# Patient Record
Sex: FEMALE
[De-identification: names, ages, dates, MRNs, and addresses within clinical notes are randomized per-mention and may not be internally consistent; named-entity substitution may affect disease eponyms.]

---

## 2024-04-08 ENCOUNTER — NON-APPOINTMENT (OUTPATIENT)
Age: 74
End: 2024-04-08

## 2024-04-08 DIAGNOSIS — Z80.3 FAMILY HISTORY OF MALIGNANT NEOPLASM OF BREAST: ICD-10-CM

## 2024-04-08 DIAGNOSIS — M65.30 TRIGGER FINGER, UNSPECIFIED FINGER: ICD-10-CM

## 2024-04-08 DIAGNOSIS — Z82.61 FAMILY HISTORY OF ARTHRITIS: ICD-10-CM

## 2024-04-08 DIAGNOSIS — Z92.3 PERSONAL HISTORY OF IRRADIATION: ICD-10-CM

## 2024-04-08 DIAGNOSIS — U07.1 COVID-19: ICD-10-CM

## 2024-04-08 DIAGNOSIS — Z86.69 PERSONAL HISTORY OF OTHER DISEASES OF THE NERVOUS SYSTEM AND SENSE ORGANS: ICD-10-CM

## 2024-04-08 DIAGNOSIS — Z83.79 FAMILY HISTORY OF OTHER DISEASES OF THE DIGESTIVE SYSTEM: ICD-10-CM

## 2024-04-08 DIAGNOSIS — Z85.6 PERSONAL HISTORY OF LEUKEMIA: ICD-10-CM

## 2024-04-08 DIAGNOSIS — Z30.41 ENCOUNTER FOR SURVEILLANCE OF CONTRACEPTIVE PILLS: ICD-10-CM

## 2024-04-08 DIAGNOSIS — Z86.007 PERSONAL HISTORY OF IN-SITU NEOPLASM OF SKIN: ICD-10-CM

## 2024-04-08 DIAGNOSIS — Z78.9 OTHER SPECIFIED HEALTH STATUS: ICD-10-CM

## 2024-04-08 DIAGNOSIS — Z85.828 PERSONAL HISTORY OF OTHER MALIGNANT NEOPLASM OF SKIN: ICD-10-CM

## 2024-04-08 DIAGNOSIS — Z85.3 PERSONAL HISTORY OF MALIGNANT NEOPLASM OF BREAST: ICD-10-CM

## 2024-04-08 DIAGNOSIS — Z87.19 PERSONAL HISTORY OF OTHER DISEASES OF THE DIGESTIVE SYSTEM: ICD-10-CM

## 2024-04-08 DIAGNOSIS — Z80.41 FAMILY HISTORY OF MALIGNANT NEOPLASM OF OVARY: ICD-10-CM

## 2024-04-08 DIAGNOSIS — B00.9 HERPESVIRAL INFECTION, UNSPECIFIED: ICD-10-CM

## 2024-04-08 DIAGNOSIS — Z80.42 FAMILY HISTORY OF MALIGNANT NEOPLASM OF PROSTATE: ICD-10-CM

## 2024-04-08 DIAGNOSIS — Z63.4 DISAPPEARANCE AND DEATH OF FAMILY MEMBER: ICD-10-CM

## 2024-04-08 PROBLEM — Z00.00 ENCOUNTER FOR PREVENTIVE HEALTH EXAMINATION: Status: ACTIVE | Noted: 2024-04-08

## 2024-04-08 RX ORDER — OMEPRAZOLE 40 MG/1
40 CAPSULE, DELAYED RELEASE ORAL
Refills: 0 | Status: ACTIVE | COMMUNITY

## 2024-04-08 RX ORDER — ADHESIVE TAPE 3"X 2.3 YD
50 MCG TAPE, NON-MEDICATED TOPICAL
Refills: 0 | Status: ACTIVE | COMMUNITY

## 2024-04-08 RX ORDER — ANASTROZOLE 1 MG/1
1 TABLET ORAL
Refills: 0 | Status: ACTIVE | COMMUNITY

## 2024-04-08 RX ORDER — ALENDRONATE SODIUM 70 MG/1
70 TABLET ORAL
Refills: 0 | Status: ACTIVE | COMMUNITY

## 2024-04-08 SDOH — SOCIAL STABILITY - SOCIAL INSECURITY: DISSAPEARANCE AND DEATH OF FAMILY MEMBER: Z63.4

## 2024-07-29 NOTE — HISTORY OF PRESENT ILLNESS
[de-identified] :   The patient has undergone annual mammogram and sonogram and twice yearly breast ultrasound.  Bilateral mammography and sonography were reportedly unremarkable in 2015.  In 2015, the patient palpated density right lower inner breast.  She presented to Dr. Fenton and was noted to have a density on sonogram in the RT lower outer quadrant, not in the palpable area.  Ultrasound-guided core biopsy on 16, revealed invasive lobular carcinoma,a classic and histiocytoid type, focal DCIS, intermediate nuclear grade, solid type, admixed with LCIS, ER positive, TN negative, and HER-2/sonia negative.  Bilateral breast MRI on 3/23/16, revealed a density consistent with a known cancer as well as an abnormality in the superolateral right breast, for which there was no sonographic correlate, MRI-guided clip placement was done.  On 3/30/16, wide excision and excision of area with clip and sentinel lymph node biopsy revealed 3 sentinel lymph nodes negative, wide excision revealed invasive lobular carcinoma, histiocytoid, pleomorphic, alveolar and focally tubular type, 1.7 cm, present at biopsy site changes, negative margins, non-extensive DCIS, intermediate to high-grade, solid type with necrosis and calcifications present close to within 1 mm of inked anterior and posterior margins, no LVI, additional excision revealed fibrocystic changes and florid duct hyperplasia, negative for carcinoma.  Breast excision revealed 2 areas of intraductal papilloma containing florid duct hyperplasia and a single benign intramammary lymph node, hyalinized fibroadenoma. Tumor ER positive, TN negative, and HER-2/sonia negative.  ORS 20 S/P RT.    ALLERGIES: Questionable allergy to sulfa.   PAST MEDICAL HISTORY: 12 menarche; LMP mid 38, G2, P2,  25 and 30  for  4 months.  GYN 2015, OCP in 20s and 30s, colitis age 17, last colonoscopy 8 years ago, history of osteopenia, had taken 2 doses of Fosamax 8 years ago.  No significant medical illnesses.  SOCIAL HISTORY: , had been an  and .  No tobacco.  Drinks 2 glasses of wine 3 times per week and is active in tennis 2-3 times per week and  twice a week.  Lives alone.  FAMILY HISTORY:My Risk neg.Ashkenazic descent, with family hx: mother  of ovarian cancer at 40, pt's half sister (mother) breast cancer 55, and a maternal great aunt  of breast cancer at 55.  Paternal family history is noncontributory.

## 2024-07-29 NOTE — ASSESSMENT
[FreeTextEntry1] : . MyRisk profile negative for inherited mutation.  T1c 1.7 cm infiltrating lobular carcinoma, histiocytoid, pleomorphic, alveolar and focally tubular type, 3lymph nodes negative, ER positive, MT negative, and HER-2 negative. Oncotype recurrent score 20

## 2024-08-08 ENCOUNTER — NON-APPOINTMENT (OUTPATIENT)
Age: 74
End: 2024-08-08

## 2024-09-05 ENCOUNTER — APPOINTMENT (OUTPATIENT)
Dept: HEMATOLOGY ONCOLOGY | Facility: CLINIC | Age: 74
End: 2024-09-05
Payer: MEDICARE

## 2024-09-05 VITALS
HEIGHT: 63 IN | TEMPERATURE: 98.1 F | RESPIRATION RATE: 18 BRPM | BODY MASS INDEX: 22.68 KG/M2 | DIASTOLIC BLOOD PRESSURE: 67 MMHG | SYSTOLIC BLOOD PRESSURE: 120 MMHG | OXYGEN SATURATION: 97 % | WEIGHT: 128 LBS | HEART RATE: 82 BPM

## 2024-09-05 DIAGNOSIS — M85.80 OTHER SPECIFIED DISORDERS OF BONE DENSITY AND STRUCTURE, UNSPECIFIED SITE: ICD-10-CM

## 2024-09-05 DIAGNOSIS — Z85.3 PERSONAL HISTORY OF MALIGNANT NEOPLASM OF BREAST: ICD-10-CM

## 2024-09-05 DIAGNOSIS — C91.10 CHRONIC LYMPHOCYTIC LEUKEMIA OF B-CELL TYPE NOT HAVING ACHIEVED REMISSION: ICD-10-CM

## 2024-09-05 PROCEDURE — 99214 OFFICE O/P EST MOD 30 MIN: CPT

## 2024-09-05 PROCEDURE — G2211 COMPLEX E/M VISIT ADD ON: CPT

## 2024-09-05 PROCEDURE — 99204 OFFICE O/P NEW MOD 45 MIN: CPT

## 2024-09-05 NOTE — ASSESSMENT
[With Patient/Caregiver] : With Patient/Caregiver [Designated Health Care Proxy] : Designated Health Care Proxy [Name: ___] : Name: [unfilled] [Relationship: ___] : Relationship: [unfilled] [FreeTextEntry1] : MyRisk profile negative for inherited mutation.  T1c 1.7 cm infiltrating lobular carcinoma, histiocytoid, pleomorphic, alveolar and focally tubular type, 3lymph nodes negative, ER positive, [place] negative, and HER-2 negative. Oncotype score 20 s/p anastrozole for 8ys completed 6/24.Discussed breast MRI - due for - MMG/US now. c/o bloating -pelvic us. Cont exercise. Considering move to [facility]. Has health proxy and advance directive. F/u annual with Dr Marquez CLL. F/u 6 months [AdvancecareDate] : 9/5/24

## 2024-09-05 NOTE — HISTORY OF PRESENT ILLNESS
[de-identified] :   The patient has undergone annual mammogram and sonogram and twice yearly breast ultrasound.  Bilateral mammography and sonography were reportedly unremarkable in 2015.  In 2015, the patient palpated density right lower inner breast.  She presented to Dr. Fenton and was noted to have a density on sonogram in the RT lower outer quadrant, not in the palpable area.  Ultrasound-guided core biopsy on 16, revealed invasive lobular carcinoma,a classic and histiocytoid type, focal DCIS, intermediate nuclear grade, solid type, admixed with LCIS, ER positive, LA negative, and HER-2/sonia negative.  Bilateral breast MRI on 3/23/16, revealed a density consistent with a known cancer as well as an abnormality in the superolateral right breast, for which there was no sonographic correlate, MRI-guided clip placement was done.  On 3/30/16, wide excision and excision of area with clip and sentinel lymph node biopsy revealed 3 sentinel lymph nodes negative, wide excision revealed invasive lobular carcinoma, histiocytoid, pleomorphic, alveolar and focally tubular type, 1.7 cm, present at biopsy site changes, negative margins, non-extensive DCIS, intermediate to high-grade, solid type with necrosis and calcifications present close to within 1 mm of inked anterior and posterior margins, no LVI, additional excision revealed fibrocystic changes and florid duct hyperplasia, negative for carcinoma.  Breast excision revealed 2 areas of intraductal papilloma containing florid duct hyperplasia and a single benign intramammary lymph node, hyalinized fibroadenoma. Tumor ER positive, LA negative, and HER-2/sonia negative.  ORS 20 S/P RT. Anastrazole  - . Alendronate dc'd . Jt c/o improved. Annula bilat MMG/US - last . Hx Rt hip bursitis 24 BMD osteopenia improved  PAST MEDICAL HISTORY: 12 menarche; LMP mid 38, G2, P2,  25 and 30  for  4 months.  GYN  - hx rt ov cyst OCP in 20s and 30s, colitis age 17, last colonoscopy  with ugi.  hixf osteopenia, hx cll, hx bcc.  SOCIAL HISTORY: She is    She had been an  and .  No tobacco.  Drinks 1 glasses of wine occ and is active in tennis 2-3 times per week and  twice a week.  Lives alone.  FAMILY HISTORY: My risk negMother ov ca dod 46, sis br ca 55 (1/2 sis mat), mat cousin br ca 55                      ALLERGIES: Questionable allergy to sulfa.   PAST MEDICAL HISTORY: 12 menarche; LMP mid 38, G2, P2,  25 and 30  for  4 months.  GYN 2015, OCP in 20s and 30s, colitis age 17, last colonoscopy 8 years ago, history of osteopenia, had taken 2 doses of Fosamax 8 years ago.  No significant medical illnesses.  SOCIAL HISTORY: , had been an  and .  No tobacco.  Drinks 2 glasses of wine 3 times per week and is active in tennis 2-3 times per week and  twice a week.  Lives alone.  FAMILY HISTORY:My Risk neg.Ashkenazic descent, with family hx: mother  of ovarian cancer at 40, pt's half sister (mother) breast cancer 55, and a maternal great aunt  of breast cancer at 55.  Paternal family history is noncontributory.

## 2024-09-05 NOTE — HISTORY OF PRESENT ILLNESS
[de-identified] :   The patient has undergone annual mammogram and sonogram and twice yearly breast ultrasound.  Bilateral mammography and sonography were reportedly unremarkable in 2015.  In 2015, the patient palpated density right lower inner breast.  She presented to Dr. Fenton and was noted to have a density on sonogram in the RT lower outer quadrant, not in the palpable area.  Ultrasound-guided core biopsy on 16, revealed invasive lobular carcinoma,a classic and histiocytoid type, focal DCIS, intermediate nuclear grade, solid type, admixed with LCIS, ER positive, CA negative, and HER-2/sonia negative.  Bilateral breast MRI on 3/23/16, revealed a density consistent with a known cancer as well as an abnormality in the superolateral right breast, for which there was no sonographic correlate, MRI-guided clip placement was done.  On 3/30/16, wide excision and excision of area with clip and sentinel lymph node biopsy revealed 3 sentinel lymph nodes negative, wide excision revealed invasive lobular carcinoma, histiocytoid, pleomorphic, alveolar and focally tubular type, 1.7 cm, present at biopsy site changes, negative margins, non-extensive DCIS, intermediate to high-grade, solid type with necrosis and calcifications present close to within 1 mm of inked anterior and posterior margins, no LVI, additional excision revealed fibrocystic changes and florid duct hyperplasia, negative for carcinoma.  Breast excision revealed 2 areas of intraductal papilloma containing florid duct hyperplasia and a single benign intramammary lymph node, hyalinized fibroadenoma. Tumor ER positive, CA negative, and HER-2/sonia negative.  ORS 20 S/P RT. Anastrazole  - . Alendronate dc'd . Jt c/o improved. Annula bilat MMG/US - last . Hx Rt hip bursitis 24 BMD osteopenia improved  PAST MEDICAL HISTORY: 12 menarche; LMP mid 38, G2, P2,  25 and 30  for  4 months.  GYN  - hx rt ov cyst OCP in 20s and 30s, colitis age 17, last colonoscopy  with ugi.  hixf osteopenia, hx cll, hx bcc.  SOCIAL HISTORY: She is    She had been an  and .  No tobacco.  Drinks 1 glasses of wine occ and is active in tennis 2-3 times per week and  twice a week.  Lives alone.  FAMILY HISTORY: My risk negMother ov ca dod 46, sis br ca 55 (1/2 sis mat), mat cousin br ca 55                      ALLERGIES: Questionable allergy to sulfa.   PAST MEDICAL HISTORY: 12 menarche; LMP mid 38, G2, P2,  25 and 30  for  4 months.  GYN 2015, OCP in 20s and 30s, colitis age 17, last colonoscopy 8 years ago, history of osteopenia, had taken 2 doses of Fosamax 8 years ago.  No significant medical illnesses.  SOCIAL HISTORY: , had been an  and .  No tobacco.  Drinks 2 glasses of wine 3 times per week and is active in tennis 2-3 times per week and  twice a week.  Lives alone.  FAMILY HISTORY:My Risk neg.Ashkenazic descent, with family hx: mother  of ovarian cancer at 40, pt's half sister (mother) breast cancer 55, and a maternal great aunt  of breast cancer at 55.  Paternal family history is noncontributory.

## 2024-09-05 NOTE — PHYSICAL EXAM
[Fully active, able to carry on all pre-disease performance without restriction] : Status 0 - Fully active, able to carry on all pre-disease performance without restriction [Normal] : affect appropriate [de-identified] : s/p bilst wle Rt RT, no palp mass or LN

## 2024-09-05 NOTE — ASSESSMENT
[With Patient/Caregiver] : With Patient/Caregiver [Designated Health Care Proxy] : Designated Health Care Proxy [Name: ___] : Name: [unfilled] [Relationship: ___] : Relationship: [unfilled] [FreeTextEntry1] : MyRisk profile negative for inherited mutation.  T1c 1.7 cm infiltrating lobular carcinoma, histiocytoid, pleomorphic, alveolar and focally tubular type, 3lymph nodes negative, ER positive, [place] negative, and HER-2 negative. Oncotype score 20 s/p anastrozole for 8ys completed 6/24.Discussed breast MRI - due for - MMG/US now. c/o bloating -pelvic us. Cont exercise. Considering move to [facility]. Has health proxy and advance directive. F/u annual with Dr Marquez CLL. F/u 6 months [AdvancecareDate] : 9/5/24

## 2024-09-05 NOTE — PHYSICAL EXAM
[Fully active, able to carry on all pre-disease performance without restriction] : Status 0 - Fully active, able to carry on all pre-disease performance without restriction [Normal] : affect appropriate [de-identified] : s/p bilst wle Rt RT, no palp mass or LN

## 2024-09-05 NOTE — REVIEW OF SYSTEMS
[Diarrhea: Grade 0] : Diarrhea: Grade 0 [Negative] : Allergic/Immunologic [FreeTextEntry2] : wt    up 5 lbs [FreeTextEntry7] : gerd [FreeTextEntry8] : vag dryness

## 2025-02-06 ENCOUNTER — APPOINTMENT (OUTPATIENT)
Dept: HEMATOLOGY ONCOLOGY | Facility: CLINIC | Age: 75
End: 2025-02-06

## 2025-02-12 ENCOUNTER — NON-APPOINTMENT (OUTPATIENT)
Age: 75
End: 2025-02-12